# Patient Record
Sex: MALE | Race: OTHER | HISPANIC OR LATINO
[De-identification: names, ages, dates, MRNs, and addresses within clinical notes are randomized per-mention and may not be internally consistent; named-entity substitution may affect disease eponyms.]

---

## 2021-08-13 PROBLEM — Z00.00 ENCOUNTER FOR PREVENTIVE HEALTH EXAMINATION: Status: ACTIVE | Noted: 2021-08-13

## 2021-09-07 ENCOUNTER — APPOINTMENT (OUTPATIENT)
Dept: UROLOGY | Facility: CLINIC | Age: 56
End: 2021-09-07
Payer: COMMERCIAL

## 2021-09-07 VITALS
BODY MASS INDEX: 32.9 KG/M2 | SYSTOLIC BLOOD PRESSURE: 167 MMHG | TEMPERATURE: 97.7 F | DIASTOLIC BLOOD PRESSURE: 90 MMHG | HEIGHT: 71 IN | OXYGEN SATURATION: 99 % | WEIGHT: 235 LBS | HEART RATE: 98 BPM

## 2021-09-07 DIAGNOSIS — C61 MALIGNANT NEOPLASM OF PROSTATE: ICD-10-CM

## 2021-09-07 PROCEDURE — 99204 OFFICE O/P NEW MOD 45 MIN: CPT

## 2021-09-07 NOTE — ASSESSMENT
[FreeTextEntry1] : Today we discussed the natural history of localized prostate cancer, and the heterogeneous biology of this malignancy.  We discussed the fact that many prostate cancers are slow growing and unlikely to metastasize or cause death, whereas others can be life threatening.  We reviewed risk stratification, staging, [place] scoring, and PSA levels as they pertain to risk.  \par \par All treatment options were reviewed.  This included active surveillance, androgen deprivation, emerging options such as focal therapy/HIFU/cryotherapy, radiation options (including IMRT, SBRT, brachytherapy) and surgical options (open vs. robotic surgery, nerve vs. non-nerve sparing).  Oncologic outcomes were compared and contrasted.  Risks of biochemical and clinical recurrence discussed.  Risks of needing adjuvant or salvage treatments reviewed.  We discussed the risks of secondary malignancy after radiation.  We discussed the opportunity for pathological staging with surgery vs. other options.  We discussed the possibility of positive margins, treatment failure, cancer recurrence and cancer-related death even with treatment. \par \par All potential side effects of treatment were reviewed including, but not limited to: short term or permanent stress urinary incontinence and/or short term or permanent erectile dysfunction, penile shortening, rectal symptoms/pain, perineal pain, and other side effects. \par \par All potential complications of treatment and surgery were reviewed including, but not limited to: risks of conversion from MIS to open surgery discussed, bleeding//life-threatening hemorrhage, rectal injury requiring colostomy or delayed recognition leading to fistula, vascular/bowel/adjacent visceral organ injury, trocar/access injury, the possibility of recognized vs. unrecognized/delayed-recognition injury, risks of thermal/blunt/sharp/retraction injury, risks of DVT, PE, MI, death, risks of cardiopulmonary/anesthesia related complications, positional injury, infection/collection/abscess, wound complications/dehiscence/seroma/cellulitis, urinoma/fistula, ureteral injury/obstruction, bladder neck contracture, penile shortening, meatal stenosis, urethral stricture, lymphocele, obturator nerve injury, prolonged anastomotic leak, and other complications. \par \par \par \par \par \par \par \par

## 2021-09-07 NOTE — HISTORY OF PRESENT ILLNESS
[FreeTextEntry1] : Dear Dr Shar Vigil,\par \par Thank you so much for the referral to help care for your patient.\par \par 55 yo male presents with recent diagnosis of prostate cancer. \par WTC survivor\par \par PSA \par 2.33 8/17/21\par \par CT: nodule in left base peripheral zone of prostate gland, nonspecific on CT\par \par \par Scrotal US (ordered for pelvic pain): tubular ectasia of the rete testes bilaterally, no masses, bilateral epididymal cysts, no varicocele or hydrocele\par \par MRI Hx\par MRI at [facility] on 7/27/2021. 58  cc prostate with PIRADS 4 lesion measuring 27w12gp, mid pzpl. The lesion broadly abuts the capsule without visualized gross EPE. No LAD, No Bony Lesions.  The images have been reviewed and clinical implications discussed with the patient.\par \par PMH: CaP, HTN, anxiety\par PSH: prostate biopsy\par MEDS: lorazepam, irbesartan/hctz\par ALLS: NKDA\par FH: neg  malignancies\par SH: no smoking hx, no alcohol, 1 daughter, single, retired\par ROS: abdominal tightness

## 2021-10-26 ENCOUNTER — RESULT REVIEW (OUTPATIENT)
Age: 56
End: 2021-10-26

## 2021-10-26 ENCOUNTER — OUTPATIENT (OUTPATIENT)
Dept: OUTPATIENT SERVICES | Facility: HOSPITAL | Age: 56
LOS: 1 days | End: 2021-10-26
Payer: COMMERCIAL

## 2021-10-26 DIAGNOSIS — C61 MALIGNANT NEOPLASM OF PROSTATE: ICD-10-CM

## 2021-10-26 PROCEDURE — 88321 CONSLTJ&REPRT SLD PREP ELSWR: CPT

## 2021-10-27 ENCOUNTER — NON-APPOINTMENT (OUTPATIENT)
Age: 56
End: 2021-10-27

## 2021-10-27 LAB — SURGICAL PATHOLOGY STUDY: SIGNIFICANT CHANGE UP

## 2021-10-29 VITALS
RESPIRATION RATE: 16 BRPM | DIASTOLIC BLOOD PRESSURE: 79 MMHG | SYSTOLIC BLOOD PRESSURE: 147 MMHG | HEART RATE: 63 BPM | WEIGHT: 236.34 LBS | TEMPERATURE: 98 F | HEIGHT: 71 IN | OXYGEN SATURATION: 98 %

## 2021-10-31 ENCOUNTER — TRANSCRIPTION ENCOUNTER (OUTPATIENT)
Age: 56
End: 2021-10-31

## 2021-11-01 ENCOUNTER — INPATIENT (INPATIENT)
Facility: HOSPITAL | Age: 56
LOS: 0 days | Discharge: HOME | DRG: 708 | End: 2021-11-02
Attending: UROLOGY | Admitting: UROLOGY
Payer: COMMERCIAL

## 2021-11-01 ENCOUNTER — APPOINTMENT (OUTPATIENT)
Dept: UROLOGY | Facility: HOSPITAL | Age: 56
End: 2021-11-01

## 2021-11-01 ENCOUNTER — RESULT REVIEW (OUTPATIENT)
Age: 56
End: 2021-11-01

## 2021-11-01 DIAGNOSIS — Z98.890 OTHER SPECIFIED POSTPROCEDURAL STATES: Chronic | ICD-10-CM

## 2021-11-01 DIAGNOSIS — C61 MALIGNANT NEOPLASM OF PROSTATE: ICD-10-CM

## 2021-11-01 LAB
BLD GP AB SCN SERPL QL: NEGATIVE — SIGNIFICANT CHANGE UP
RH IG SCN BLD-IMP: POSITIVE — SIGNIFICANT CHANGE UP
SARS-COV-2 N GENE NPH QL NAA+PROBE: NOT DETECTED

## 2021-11-01 PROCEDURE — 88331 PATH CONSLTJ SURG 1 BLK 1SPC: CPT | Mod: 26

## 2021-11-01 PROCEDURE — 88305 TISSUE EXAM BY PATHOLOGIST: CPT | Mod: 26

## 2021-11-01 PROCEDURE — S2900 ROBOTIC SURGICAL SYSTEM: CPT

## 2021-11-01 PROCEDURE — 38571 LAPAROSCOPY LYMPHADENECTOMY: CPT

## 2021-11-01 PROCEDURE — 55866 LAPS SURG PRST8ECT RPBIC RAD: CPT

## 2021-11-01 PROCEDURE — 88309 TISSUE EXAM BY PATHOLOGIST: CPT | Mod: 26

## 2021-11-01 RX ORDER — PANTOPRAZOLE SODIUM 20 MG/1
40 TABLET, DELAYED RELEASE ORAL
Refills: 0 | Status: DISCONTINUED | OUTPATIENT
Start: 2021-11-01 | End: 2021-11-02

## 2021-11-01 RX ORDER — GABAPENTIN 400 MG/1
300 CAPSULE ORAL ONCE
Refills: 0 | Status: COMPLETED | OUTPATIENT
Start: 2021-11-01 | End: 2021-11-01

## 2021-11-01 RX ORDER — OXYCODONE HYDROCHLORIDE 5 MG/1
5 TABLET ORAL EVERY 6 HOURS
Refills: 0 | Status: DISCONTINUED | OUTPATIENT
Start: 2021-11-01 | End: 2021-11-02

## 2021-11-01 RX ORDER — LIDOCAINE 4 G/100G
1 CREAM TOPICAL EVERY 24 HOURS
Refills: 0 | Status: DISCONTINUED | OUTPATIENT
Start: 2021-11-01 | End: 2021-11-02

## 2021-11-01 RX ORDER — ACETAMINOPHEN 500 MG
1000 TABLET ORAL ONCE
Refills: 0 | Status: COMPLETED | OUTPATIENT
Start: 2021-11-01 | End: 2021-11-01

## 2021-11-01 RX ORDER — IRBESARTAN AND HYDROCHLOROTHIAZIDE 12.5; 3 MG/1; MG/1
0 TABLET ORAL
Qty: 0 | Refills: 0 | DISCHARGE

## 2021-11-01 RX ORDER — SODIUM CHLORIDE 9 MG/ML
1000 INJECTION, SOLUTION INTRAVENOUS
Refills: 0 | Status: DISCONTINUED | OUTPATIENT
Start: 2021-11-01 | End: 2021-11-02

## 2021-11-01 RX ORDER — ONDANSETRON 8 MG/1
8 TABLET, FILM COATED ORAL EVERY 8 HOURS
Refills: 0 | Status: COMPLETED | OUTPATIENT
Start: 2021-11-01 | End: 2021-11-02

## 2021-11-01 RX ORDER — OXYBUTYNIN CHLORIDE 5 MG
5 TABLET ORAL EVERY 8 HOURS
Refills: 0 | Status: DISCONTINUED | OUTPATIENT
Start: 2021-11-01 | End: 2021-11-02

## 2021-11-01 RX ORDER — ONDANSETRON 8 MG/1
8 TABLET, FILM COATED ORAL EVERY 8 HOURS
Refills: 0 | Status: DISCONTINUED | OUTPATIENT
Start: 2021-11-01 | End: 2021-11-02

## 2021-11-01 RX ORDER — IRBESARTAN 75 MG/1
1 TABLET ORAL
Qty: 0 | Refills: 0 | DISCHARGE

## 2021-11-01 RX ORDER — PANTOPRAZOLE SODIUM 20 MG/1
1 TABLET, DELAYED RELEASE ORAL
Qty: 0 | Refills: 0 | DISCHARGE

## 2021-11-01 RX ORDER — ACETAMINOPHEN 500 MG
1000 TABLET ORAL EVERY 6 HOURS
Refills: 0 | Status: COMPLETED | OUTPATIENT
Start: 2021-11-01 | End: 2021-11-02

## 2021-11-01 RX ORDER — LOSARTAN POTASSIUM 100 MG/1
100 TABLET, FILM COATED ORAL DAILY
Refills: 0 | Status: DISCONTINUED | OUTPATIENT
Start: 2021-11-02 | End: 2021-11-02

## 2021-11-01 RX ORDER — ENOXAPARIN SODIUM 100 MG/ML
40 INJECTION SUBCUTANEOUS ONCE
Refills: 0 | Status: COMPLETED | OUTPATIENT
Start: 2021-11-01 | End: 2021-11-01

## 2021-11-01 RX ORDER — CEFAZOLIN SODIUM 1 G
1000 VIAL (EA) INJECTION EVERY 8 HOURS
Refills: 0 | Status: COMPLETED | OUTPATIENT
Start: 2021-11-01 | End: 2021-11-02

## 2021-11-01 RX ADMIN — Medication 1000 MILLIGRAM(S): at 21:32

## 2021-11-01 RX ADMIN — Medication 100 MILLIGRAM(S): at 23:29

## 2021-11-01 RX ADMIN — Medication 5 MILLIGRAM(S): at 15:06

## 2021-11-01 RX ADMIN — OXYCODONE HYDROCHLORIDE 5 MILLIGRAM(S): 5 TABLET ORAL at 14:18

## 2021-11-01 RX ADMIN — GABAPENTIN 300 MILLIGRAM(S): 400 CAPSULE ORAL at 06:41

## 2021-11-01 RX ADMIN — Medication 1000 MILLIGRAM(S): at 22:02

## 2021-11-01 RX ADMIN — Medication 1000 MILLIGRAM(S): at 06:41

## 2021-11-01 RX ADMIN — LIDOCAINE 1 PATCH: 4 CREAM TOPICAL at 18:18

## 2021-11-01 RX ADMIN — ENOXAPARIN SODIUM 40 MILLIGRAM(S): 100 INJECTION SUBCUTANEOUS at 06:41

## 2021-11-01 RX ADMIN — OXYCODONE HYDROCHLORIDE 5 MILLIGRAM(S): 5 TABLET ORAL at 23:29

## 2021-11-01 RX ADMIN — Medication 1000 MILLIGRAM(S): at 15:06

## 2021-11-01 RX ADMIN — ONDANSETRON 8 MILLIGRAM(S): 8 TABLET, FILM COATED ORAL at 15:43

## 2021-11-01 RX ADMIN — LIDOCAINE 1 PATCH: 4 CREAM TOPICAL at 15:43

## 2021-11-01 RX ADMIN — Medication 1000 MILLIGRAM(S): at 15:52

## 2021-11-01 RX ADMIN — Medication 100 MILLIGRAM(S): at 15:42

## 2021-11-01 RX ADMIN — OXYCODONE HYDROCHLORIDE 5 MILLIGRAM(S): 5 TABLET ORAL at 12:50

## 2021-11-01 RX ADMIN — ONDANSETRON 8 MILLIGRAM(S): 8 TABLET, FILM COATED ORAL at 23:29

## 2021-11-01 RX ADMIN — OXYCODONE HYDROCHLORIDE 5 MILLIGRAM(S): 5 TABLET ORAL at 23:59

## 2021-11-01 NOTE — PROGRESS NOTE ADULT - PROBLEM SELECTOR PLAN 1
- stable post-operatively   - continue givens   - monitor urine color   - pain control: tylenol, lidocaine patch   - bladder spasm regimen: ditropan PRN   - abx: Ancef   - diet: clears   - OOB on POD #0   - SCDs  - ISS

## 2021-11-01 NOTE — PROGRESS NOTE ADULT - ASSESSMENT
57yo male with PMH of prostate cancer, anxiety, hypertension, GERD s/p robotic simple prostatectomy POD#0.

## 2021-11-02 ENCOUNTER — TRANSCRIPTION ENCOUNTER (OUTPATIENT)
Age: 56
End: 2021-11-02

## 2021-11-02 VITALS
DIASTOLIC BLOOD PRESSURE: 71 MMHG | SYSTOLIC BLOOD PRESSURE: 152 MMHG | TEMPERATURE: 98 F | OXYGEN SATURATION: 96 % | RESPIRATION RATE: 18 BRPM | HEART RATE: 75 BPM

## 2021-11-02 LAB
ANION GAP SERPL CALC-SCNC: 11 MMOL/L — SIGNIFICANT CHANGE UP (ref 5–17)
BUN SERPL-MCNC: 17 MG/DL — SIGNIFICANT CHANGE UP (ref 7–23)
CALCIUM SERPL-MCNC: 9.4 MG/DL — SIGNIFICANT CHANGE UP (ref 8.4–10.5)
CHLORIDE SERPL-SCNC: 103 MMOL/L — SIGNIFICANT CHANGE UP (ref 96–108)
CO2 SERPL-SCNC: 25 MMOL/L — SIGNIFICANT CHANGE UP (ref 22–31)
CREAT SERPL-MCNC: 1 MG/DL — SIGNIFICANT CHANGE UP (ref 0.5–1.3)
GLUCOSE SERPL-MCNC: 126 MG/DL — HIGH (ref 70–99)
HCT VFR BLD CALC: 37.5 % — LOW (ref 39–50)
HGB BLD-MCNC: 12.8 G/DL — LOW (ref 13–17)
MCHC RBC-ENTMCNC: 30.4 PG — SIGNIFICANT CHANGE UP (ref 27–34)
MCHC RBC-ENTMCNC: 34.1 GM/DL — SIGNIFICANT CHANGE UP (ref 32–36)
MCV RBC AUTO: 89.1 FL — SIGNIFICANT CHANGE UP (ref 80–100)
NRBC # BLD: 0 /100 WBCS — SIGNIFICANT CHANGE UP (ref 0–0)
PLATELET # BLD AUTO: 217 K/UL — SIGNIFICANT CHANGE UP (ref 150–400)
POTASSIUM SERPL-MCNC: 4.3 MMOL/L — SIGNIFICANT CHANGE UP (ref 3.5–5.3)
POTASSIUM SERPL-SCNC: 4.3 MMOL/L — SIGNIFICANT CHANGE UP (ref 3.5–5.3)
RBC # BLD: 4.21 M/UL — SIGNIFICANT CHANGE UP (ref 4.2–5.8)
RBC # FLD: 13.2 % — SIGNIFICANT CHANGE UP (ref 10.3–14.5)
SODIUM SERPL-SCNC: 139 MMOL/L — SIGNIFICANT CHANGE UP (ref 135–145)
WBC # BLD: 13.52 K/UL — HIGH (ref 3.8–10.5)
WBC # FLD AUTO: 13.52 K/UL — HIGH (ref 3.8–10.5)

## 2021-11-02 RX ORDER — HEPARIN SODIUM 5000 [USP'U]/ML
5000 INJECTION INTRAVENOUS; SUBCUTANEOUS EVERY 8 HOURS
Refills: 0 | Status: DISCONTINUED | OUTPATIENT
Start: 2021-11-02 | End: 2021-11-02

## 2021-11-02 RX ORDER — DOCUSATE SODIUM 100 MG
1 CAPSULE ORAL
Qty: 20 | Refills: 0
Start: 2021-11-02 | End: 2021-11-11

## 2021-11-02 RX ORDER — ACETAMINOPHEN WITH CODEINE 300MG-30MG
1 TABLET ORAL
Qty: 6 | Refills: 0
Start: 2021-11-02 | End: 2021-11-04

## 2021-11-02 RX ADMIN — LIDOCAINE 1 PATCH: 4 CREAM TOPICAL at 14:10

## 2021-11-02 RX ADMIN — PANTOPRAZOLE SODIUM 40 MILLIGRAM(S): 20 TABLET, DELAYED RELEASE ORAL at 06:30

## 2021-11-02 RX ADMIN — LIDOCAINE 1 PATCH: 4 CREAM TOPICAL at 19:27

## 2021-11-02 RX ADMIN — LOSARTAN POTASSIUM 100 MILLIGRAM(S): 100 TABLET, FILM COATED ORAL at 06:00

## 2021-11-02 RX ADMIN — OXYCODONE HYDROCHLORIDE 5 MILLIGRAM(S): 5 TABLET ORAL at 19:35

## 2021-11-02 RX ADMIN — Medication 1000 MILLIGRAM(S): at 09:28

## 2021-11-02 RX ADMIN — Medication 1000 MILLIGRAM(S): at 03:11

## 2021-11-02 RX ADMIN — Medication 100 MILLIGRAM(S): at 06:31

## 2021-11-02 RX ADMIN — LIDOCAINE 1 PATCH: 4 CREAM TOPICAL at 03:14

## 2021-11-02 RX ADMIN — OXYCODONE HYDROCHLORIDE 5 MILLIGRAM(S): 5 TABLET ORAL at 12:02

## 2021-11-02 RX ADMIN — ONDANSETRON 8 MILLIGRAM(S): 8 TABLET, FILM COATED ORAL at 06:31

## 2021-11-02 RX ADMIN — HEPARIN SODIUM 5000 UNIT(S): 5000 INJECTION INTRAVENOUS; SUBCUTANEOUS at 14:09

## 2021-11-02 RX ADMIN — Medication 1000 MILLIGRAM(S): at 03:41

## 2021-11-02 RX ADMIN — OXYCODONE HYDROCHLORIDE 5 MILLIGRAM(S): 5 TABLET ORAL at 13:00

## 2021-11-02 RX ADMIN — Medication 1000 MILLIGRAM(S): at 08:28

## 2021-11-02 RX ADMIN — SODIUM CHLORIDE 120 MILLILITER(S): 9 INJECTION, SOLUTION INTRAVENOUS at 08:29

## 2021-11-02 NOTE — DISCHARGE NOTE PROVIDER - CARE PROVIDER_API CALL
Wilbert Scott)  Urology  [address], [address] Floor  [place], [place] [id number]  Phone: [phone]  Fax: [phone]  Follow Up Time:

## 2021-11-02 NOTE — DISCHARGE NOTE PROVIDER - NSDCCPCAREPLAN_GEN_ALL_CORE_FT
PRINCIPAL DISCHARGE DIAGNOSIS  Diagnosis: Prostate CA  Assessment and Plan of Treatment: Follow up with Dr. Scott      SECONDARY DISCHARGE DIAGNOSES  Diagnosis: Hypertension  Assessment and Plan of Treatment: Continue home medication regimen. Follow up with PCP/Specialist.

## 2021-11-02 NOTE — DISCHARGE NOTE PROVIDER - NSDCMRMEDTOKEN_GEN_ALL_CORE_FT
Augmentin 875 mg-125 mg oral tablet: 1 tab(s) orally every 12 hours   codeine-acetaminophen 30 mg-300 mg oral tablet: 1 tab(s) orally every 6 hours MDD:4  Colace 100 mg oral capsule: 1 cap(s) orally 2 times a day   irbesartan-hydrochlorothiazide 300 mg-25 mg oral tablet:   LORazepam 1 mg oral tablet: 1 tab(s) orally 3 times a day, As Needed  pantoprazole 40 mg oral delayed release tablet: 1 tab(s) orally once a day

## 2021-11-02 NOTE — DISCHARGE NOTE PROVIDER - HOSPITAL COURSE
56 year old male with history of CaP, anxiety, HTN, GERD s/p RALP 11/1. Surgical course without complications. Hospitalization course unremarkable. patient is afebrile, hemodynamically stable and optimized for discharge home.

## 2021-11-02 NOTE — DISCHARGE NOTE PROVIDER - NSDCFUADDINST_GEN_ALL_CORE_FT
-Use extra strength Tylenol (500-650mg) PRN for pain q6hrs  -Use Colace as stool softener as prescribed  -For severe pain not alleviated by OTC tylenol use acetaminophen-codeine #3 every 6 hours only as needed  -Start antibiotics (Augmentin 875-125mg BID) 1 day prior to catheter removal in office      Cisneros Catheter Instructions:  - Please care for and empty urinary catheter bag as instructed by nurse.    General Discharge Instructions:  Use Tylenol for pain control as needed  Please resume all regular home medications unless specifically advised not to take a particular medication. Also, please take any new medications as prescribed.  Please get plenty of rest, continue to ambulate several times per day, and drink adequate amounts of fluids.     Warning Signs:  Please call your doctor if you experience the following:  *You experience new chest pain, pressure, squeezing or tightness.  *New or worsening cough, shortness of breath, or wheeze.  *If you are vomiting and cannot keep down fluids or your medications.  *You are getting dehydrated due to continued vomiting, diarrhea, or other reasons. Signs of dehydration include dry mouth, rapid heartbeat, or feeling dizzy or faint when standing.  *You see blood or dark/black material when you vomit or have a bowel movement.  *You experience burning when you urinate, have blood in your urine, or experience a discharge.  *Your pain is not improving within 8-12 hours or is not gone within 24 hours. Call or return immediately if your pain is getting worse, changes location, or moves to your chest or back.  *You have shaking chills, or fever greater than 100.4 degrees Fahrenheit.  *Any change in your symptoms, or any new symptoms that concern you.

## 2021-11-02 NOTE — DISCHARGE NOTE NURSING/CASE MANAGEMENT/SOCIAL WORK - PATIENT PORTAL LINK FT
You can access the FollowMyHealth Patient Portal offered by [facility] by registering at the following website: [email or web address]. By joining [email or web address]’s FollowMyHealth portal, you will also be able to view your health information using other applications (apps) compatible with our system.

## 2021-11-02 NOTE — PROGRESS NOTE ADULT - SUBJECTIVE AND OBJECTIVE BOX
INTERVAL HPI/OVERNIGHT EVENTS:  No acute events overnight.    VITALS:    T(F): 98.4 (11-02-21 @ 04:50), Max: 98.6 (11-02-21 @ 00:03)  HR: 72 (11-02-21 @ 04:50) (72 - 99)  BP: 133/77 (11-02-21 @ 04:50) (125/59 - 158/90)  RR: 18 (11-02-21 @ 04:50) (17 - 24)  SpO2: 97% (11-02-21 @ 04:50) (95% - 100%)  Wt(kg): --    I&O's Detail    01 Nov 2021 07:01  -  02 Nov 2021 05:34  --------------------------------------------------------  IN:    IV PiggyBack: 50 mL    Lactated Ringers: 1800 mL  Total IN: 1850 mL    OUT:    Indwelling Catheter - Urethral (mL): 3545 mL  Total OUT: 3545 mL    Total NET: -1695 mL          MEDICATIONS:    ANTIBIOTICS:  ceFAZolin   IVPB 1000 milliGRAM(s) IV Intermittent every 8 hours      PAIN CONTROL:  acetaminophen     Tablet .. 1000 milliGRAM(s) Oral every 6 hours  LORazepam     Tablet 1 milliGRAM(s) Oral every 12 hours PRN  ondansetron    Tablet 8 milliGRAM(s) Oral every 8 hours  ondansetron    Tablet 8 milliGRAM(s) Oral every 8 hours PRN  oxyCODONE    IR 5 milliGRAM(s) Oral every 6 hours PRN       MEDS:  oxybutynin 5 milliGRAM(s) Oral every 8 hours PRN      HEME/ONC        PHYSICAL EXAM:  General: No acute distress.  Alert and Oriented  Abdominal Exam: soft appropriately tender/ softly distended. Incision sites C/D/I.    Exam: Cisneros catheter in place draining well.      LABS:                RADIOLOGY & ADDITIONAL TESTS:    ASSESSMENT: 56yMale w/ CaP s/p RALP.     PLAN:  Diet: clears  Pain control  Monitor Urine Output  DVT ppx: SCD  Cont Abx: Ancef  OOB/IS  
   POST OP NOTE:  procedure: robotic radical prostatectomy     Patient admits to generalized abdominal pain. Denies chest pain, SOB, nausea, vomiting, fevers or chills.       T(C): 36.1 (11-01-21 @ 12:37), Max: 36.2 (11-01-21 @ 11:37)  HR: 86 (11-01-21 @ 12:37) (78 - 86)  BP: 151/66 (11-01-21 @ 12:37) (125/59 - 151/66)  RR: 17 (11-01-21 @ 12:37) (17 - 24)  SpO2: 98% (11-01-21 @ 12:37) (98% - 100%)    GEN: no acute distress, alert and oriented   ABD: Soft, non-tender, non-distended. incisions c/d/i   : givens in place draining light pink urine. no clots noted

## 2021-11-03 ENCOUNTER — NON-APPOINTMENT (OUTPATIENT)
Age: 56
End: 2021-11-03

## 2021-11-03 PROBLEM — I10 ESSENTIAL (PRIMARY) HYPERTENSION: Chronic | Status: ACTIVE | Noted: 2021-10-29

## 2021-11-03 PROBLEM — C61 MALIGNANT NEOPLASM OF PROSTATE: Chronic | Status: ACTIVE | Noted: 2021-10-29

## 2021-11-03 PROBLEM — K21.9 GASTRO-ESOPHAGEAL REFLUX DISEASE WITHOUT ESOPHAGITIS: Chronic | Status: ACTIVE | Noted: 2021-10-29

## 2021-11-04 ENCOUNTER — APPOINTMENT (OUTPATIENT)
Dept: UROLOGY | Facility: CLINIC | Age: 56
End: 2021-11-04
Payer: COMMERCIAL

## 2021-11-04 VITALS
OXYGEN SATURATION: 98 % | SYSTOLIC BLOOD PRESSURE: 142 MMHG | TEMPERATURE: 98.6 F | HEART RATE: 74 BPM | RESPIRATION RATE: 20 BRPM | DIASTOLIC BLOOD PRESSURE: 80 MMHG

## 2021-11-04 PROCEDURE — 99024 POSTOP FOLLOW-UP VISIT: CPT

## 2021-11-04 RX ORDER — AMOXICILLIN AND CLAVULANATE POTASSIUM 875; 125 MG/1; MG/1
875-125 TABLET, COATED ORAL TWICE DAILY
Qty: 20 | Refills: 0 | Status: ACTIVE | COMMUNITY
Start: 2021-11-04 | End: 1900-01-01

## 2021-11-04 NOTE — PHYSICAL EXAM
[General Appearance - Well Developed] : well developed [General Appearance - Well Nourished] : well nourished [Normal Appearance] : normal appearance [Well Groomed] : well groomed [General Appearance - In No Acute Distress] : no acute distress [FreeTextEntry1] : mild pain with palpation of L surgical site, abdomen soft  [Edema] : no peripheral edema [] : no respiratory distress [Respiration, Rhythm And Depth] : normal respiratory rhythm and effort [Exaggerated Use Of Accessory Muscles For Inspiration] : no accessory muscle use [Oriented To Time, Place, And Person] : oriented to person, place, and time [Affect] : the affect was normal [Mood] : the mood was normal [Not Anxious] : not anxious [Normal Station and Gait] : the gait and station were normal for the patient's age [No Focal Deficits] : no focal deficits

## 2021-11-04 NOTE — HISTORY OF PRESENT ILLNESS
[FreeTextEntry1] : 57 yo male s/p RALP 11/1/21 presents with concern for catheter dislodgement. He has experienced "chills and shakes" that wake him up, one episode each night for the past two nights. He has never felt febrile but did not take temperature. L abdominal site slightly sore. He has not had a BM but is passing flatus. Otherwise feels well postop.

## 2021-11-04 NOTE — ASSESSMENT
[FreeTextEntry1] : 57 yo male s/p RALP 11/1/21 complaining of "chills and shakes" x2 episodes and concern for catheter dislodgement. Stat lock had come off and is tugging/uncomfortable.\par \par 1. Catheter draining well, statlock replaced\par 2. Miralax and docusate\par 3. CBC, BMP, culture (from bag)\par 4. Preop culture negative, will give augmentin x10 days prophylactically\par 5. Advised on return precautions\par \par Follow up 11/9/21 for postop visit/catheter removal\par

## 2021-11-08 LAB — SURGICAL PATHOLOGY STUDY: SIGNIFICANT CHANGE UP

## 2021-11-09 ENCOUNTER — APPOINTMENT (OUTPATIENT)
Dept: UROLOGY | Facility: CLINIC | Age: 56
End: 2021-11-09
Payer: COMMERCIAL

## 2021-11-09 VITALS
SYSTOLIC BLOOD PRESSURE: 140 MMHG | HEART RATE: 77 BPM | OXYGEN SATURATION: 98 % | TEMPERATURE: 98.1 F | DIASTOLIC BLOOD PRESSURE: 70 MMHG

## 2021-11-09 DIAGNOSIS — F41.9 ANXIETY DISORDER, UNSPECIFIED: ICD-10-CM

## 2021-11-09 DIAGNOSIS — K21.9 GASTRO-ESOPHAGEAL REFLUX DISEASE WITHOUT ESOPHAGITIS: ICD-10-CM

## 2021-11-09 DIAGNOSIS — I10 ESSENTIAL (PRIMARY) HYPERTENSION: ICD-10-CM

## 2021-11-09 DIAGNOSIS — C61 MALIGNANT NEOPLASM OF PROSTATE: ICD-10-CM

## 2021-11-09 PROCEDURE — 99024 POSTOP FOLLOW-UP VISIT: CPT

## 2021-11-09 NOTE — HISTORY OF PRESENT ILLNESS
[FreeTextEntry1] : Patient returns 1 week after RALP for catheter removal.\par \par Post-op came reporting 1 episode shaking chills x 2 nights. Was transient, patient did not feel like had fever, and immediately went back to sleep. Came to office, labs were within normal limits, and started empirically on Augmentin. \par \par Since that time reports resolution of symptoms. He has been doing well without N/V/Fever. Urine clear, draining well. Tolerating a regular diet, passing gas and BM. No incisional complaints. No leg swelling or calf pain, no SOB or CP.\par \par Bladder filled with ~200cc of urine Cisneros removed without issue. Patient voided without issue.\par \par Plan is for PSA in 6 weeks\par \par Patient educated as to performance/importance of Kegel exercises; will also arrange PT. Daily 5mg Cialis. \par \par Pathology reviewed and options discussed (see below)\par \par GG2\par EPE\par 0/5 LN\par [id number]\par

## 2021-11-09 NOTE — PHYSICAL EXAM
[General Appearance - Well Developed] : well developed [General Appearance - Well Nourished] : well nourished [Normal Appearance] : normal appearance [Well Groomed] : well groomed [General Appearance - In No Acute Distress] : no acute distress [Edema] : no peripheral edema [Respiration, Rhythm And Depth] : normal respiratory rhythm and effort [Exaggerated Use Of Accessory Muscles For Inspiration] : no accessory muscle use [Abdomen Soft] : soft [Abdomen Tenderness] : non-tender [Costovertebral Angle Tenderness] : no ~M costovertebral angle tenderness [Urethral Meatus] : meatus normal [Normal Station and Gait] : the gait and station were normal for the patient's age [] : no rash [No Focal Deficits] : no focal deficits [Oriented To Time, Place, And Person] : oriented to person, place, and time [Affect] : the affect was normal [Mood] : the mood was normal [Not Anxious] : not anxious [No Palpable Adenopathy] : no palpable adenopathy [FreeTextEntry1] : Catheter removed without issue

## 2021-11-09 NOTE — ASSESSMENT
[FreeTextEntry1] : 56 s/p RALP. Doing well.\par \par - fu in 6 weeks, PSA beforehand\par - daily Cialis\par - importance of Kegels discussed

## 2021-11-11 ENCOUNTER — APPOINTMENT (OUTPATIENT)
Dept: UROLOGY | Facility: CLINIC | Age: 56
End: 2021-11-11

## 2021-11-11 LAB
ANION GAP SERPL CALC-SCNC: 15 MMOL/L
BACTERIA UR CULT: NORMAL
BASOPHILS # BLD AUTO: 0.02 K/UL
BASOPHILS NFR BLD AUTO: 0.2 %
BUN SERPL-MCNC: 15 MG/DL
CALCIUM SERPL-MCNC: 9.8 MG/DL
CHLORIDE SERPL-SCNC: 98 MMOL/L
CO2 SERPL-SCNC: 26 MMOL/L
CREAT SERPL-MCNC: 0.93 MG/DL
EOSINOPHIL # BLD AUTO: 0.03 K/UL
EOSINOPHIL NFR BLD AUTO: 0.4 %
GLUCOSE SERPL-MCNC: 113 MG/DL
HCT VFR BLD CALC: 39.7 %
HGB BLD-MCNC: 13.2 G/DL
IMM GRANULOCYTES NFR BLD AUTO: 0.4 %
LYMPHOCYTES # BLD AUTO: 1.75 K/UL
LYMPHOCYTES NFR BLD AUTO: 21.1 %
MAN DIFF?: NORMAL
MCHC RBC-ENTMCNC: 30.5 PG
MCHC RBC-ENTMCNC: 33.2 GM/DL
MCV RBC AUTO: 91.7 FL
MONOCYTES # BLD AUTO: 0.96 K/UL
MONOCYTES NFR BLD AUTO: 11.6 %
NEUTROPHILS # BLD AUTO: 5.49 K/UL
NEUTROPHILS NFR BLD AUTO: 66.3 %
PLATELET # BLD AUTO: 228 K/UL
POTASSIUM SERPL-SCNC: 4.5 MMOL/L
RBC # BLD: 4.33 M/UL
RBC # FLD: 13.6 %
SODIUM SERPL-SCNC: 139 MMOL/L
WBC # FLD AUTO: 8.28 K/UL

## 2021-11-17 PROCEDURE — 80048 BASIC METABOLIC PNL TOTAL CA: CPT

## 2021-11-17 PROCEDURE — 86900 BLOOD TYPING SEROLOGIC ABO: CPT

## 2021-11-17 PROCEDURE — 88305 TISSUE EXAM BY PATHOLOGIST: CPT

## 2021-11-17 PROCEDURE — 86901 BLOOD TYPING SEROLOGIC RH(D): CPT

## 2021-11-17 PROCEDURE — 86850 RBC ANTIBODY SCREEN: CPT

## 2021-11-17 PROCEDURE — C1889: CPT

## 2021-11-17 PROCEDURE — C9399: CPT

## 2021-11-17 PROCEDURE — S2900: CPT

## 2021-11-17 PROCEDURE — 36415 COLL VENOUS BLD VENIPUNCTURE: CPT

## 2021-11-17 PROCEDURE — 85027 COMPLETE CBC AUTOMATED: CPT

## 2021-11-17 PROCEDURE — 88331 PATH CONSLTJ SURG 1 BLK 1SPC: CPT

## 2021-11-17 PROCEDURE — 88309 TISSUE EXAM BY PATHOLOGIST: CPT

## 2021-11-23 RX ORDER — TADALAFIL 5 MG/1
5 TABLET ORAL
Qty: 30 | Refills: 3 | Status: ACTIVE | COMMUNITY
Start: 2021-11-09 | End: 1900-01-01

## 2022-01-04 ENCOUNTER — APPOINTMENT (OUTPATIENT)
Dept: UROLOGY | Facility: CLINIC | Age: 57
End: 2022-01-04

## 2022-01-11 NOTE — HISTORY OF PRESENT ILLNESS
[FreeTextEntry1] : CC: s/p RALP\par \par HPI: 56 year old s/p RALP 11/1/21\par \par Pathology: GG2, EPE, 0/5 LN, [id number]\par \par \par PMH: CaP, HTN, anxiety\par PSH: prostate biopsy\par MEDS: lorazepam, irbesartan/hctz\par ALLS: NKDA\par FH: neg  malignancies\par SH: no smoking hx, no alcohol, 1 daughter, single, retired\par ROS: abdominal tightness

## 2022-03-29 ENCOUNTER — APPOINTMENT (OUTPATIENT)
Dept: UROLOGY | Facility: CLINIC | Age: 57
End: 2022-03-29
Payer: COMMERCIAL

## 2022-03-29 ENCOUNTER — TRANSCRIPTION ENCOUNTER (OUTPATIENT)
Age: 57
End: 2022-03-29

## 2022-03-29 VITALS
DIASTOLIC BLOOD PRESSURE: 67 MMHG | SYSTOLIC BLOOD PRESSURE: 146 MMHG | OXYGEN SATURATION: 98 % | TEMPERATURE: 97.3 F | HEART RATE: 64 BPM

## 2022-03-29 PROCEDURE — 99214 OFFICE O/P EST MOD 30 MIN: CPT

## 2022-03-29 NOTE — PHYSICAL EXAM
[General Appearance - Well Developed] : well developed [General Appearance - Well Nourished] : well nourished [Normal Appearance] : normal appearance [Well Groomed] : well groomed [General Appearance - In No Acute Distress] : no acute distress [Edema] : no peripheral edema [] : no respiratory distress [Respiration, Rhythm And Depth] : normal respiratory rhythm and effort [Exaggerated Use Of Accessory Muscles For Inspiration] : no accessory muscle use [Abdomen Soft] : soft [Abdomen Tenderness] : non-tender [Costovertebral Angle Tenderness] : no ~M costovertebral angle tenderness [Urethral Meatus] : meatus normal [Penis Abnormality] : normal uncircumcised penis [Scrotum] : the scrotum was normal [Urinary Bladder Findings] : the bladder was normal on palpation [Epididymis] : the epididymides were normal [Testes Tenderness] : no tenderness of the testes [Testes Mass (___cm)] : there were no testicular masses [No Focal Deficits] : no focal deficits [Oriented To Time, Place, And Person] : oriented to person, place, and time [Affect] : the affect was normal [Mood] : the mood was normal [Not Anxious] : not anxious [No Palpable Adenopathy] : no palpable adenopathy

## 2022-03-29 NOTE — ASSESSMENT
[FreeTextEntry1] : Diagnosis:\par History of prostate cancer\par \par \par Plan: \par PSA in 6 months \par He is open to injection therapy in the future but not at this moment\par Observe and reassess in 6 mo\par \par \par Wilbert Scott MD, FR \par  of Urology [facility]\par Director of Laparoscopic and Robotic Surgery \par [facility] Director of Urology, [facility] \par Professor of Urology\par \par (Office) [phone]\par (Cell)  [phone] \par [email or web address]\par \par \par

## 2022-03-29 NOTE — HISTORY OF PRESENT ILLNESS
[FreeTextEntry1] : Shar Vigil MD\par [address] \[place] [address]\par  \par CC: s/p RALP\par \par HPI: 56 year old s/p RALP 11/1/21\par Pathology: GG2, EPE, 0/5 LN, [id number]\par PSA < 0.02 ng/ml\par \par He has some leak with "lifting, but overall pretty good"; "gets a leak here and there if lifting". \par He states that "absolutely is improvlng".  No leak on exam even when provoke with 5 coughs. \par \par He recently started tadalafil 5mg but no erections yet  \par \par PMH: CaP, HTN, anxiety\par PSH: prostate biopsy, RALP \par MEDS: lorazepam, irbesartan/hctz\par ALLS: NKDA\par FH: neg  malignancies\par SH: no smoking hx, no alcohol, 1 daughter, single, retired\par ROS: abdominal tightness \par  \par

## 2022-07-23 NOTE — PRE-OP CHECKLIST - LATEX ALLERGY
Reviewed self and infant care w / mom, she verbalizes understanding of instructions reviewed. Encourage to follow up w/ MDs as directed and w/ questions/concerns.
no

## 2023-01-05 ENCOUNTER — APPOINTMENT (OUTPATIENT)
Dept: UROLOGY | Facility: CLINIC | Age: 58
End: 2023-01-05
Payer: COMMERCIAL

## 2023-01-05 VITALS
DIASTOLIC BLOOD PRESSURE: 81 MMHG | HEART RATE: 71 BPM | SYSTOLIC BLOOD PRESSURE: 162 MMHG | OXYGEN SATURATION: 98 % | TEMPERATURE: 97.3 F

## 2023-01-05 PROCEDURE — 99214 OFFICE O/P EST MOD 30 MIN: CPT

## 2023-01-05 RX ORDER — TADALAFIL 20 MG/1
20 TABLET ORAL
Qty: 30 | Refills: 3 | Status: ACTIVE | COMMUNITY
Start: 2023-01-05 | End: 1900-01-01

## 2023-01-05 NOTE — PHYSICAL EXAM
[General Appearance - Well Developed] : well developed [General Appearance - Well Nourished] : well nourished [Normal Appearance] : normal appearance [Well Groomed] : well groomed [General Appearance - In No Acute Distress] : no acute distress [Edema] : no peripheral edema [Respiration, Rhythm And Depth] : normal respiratory rhythm and effort [Exaggerated Use Of Accessory Muscles For Inspiration] : no accessory muscle use [Abdomen Soft] : soft [Abdomen Tenderness] : non-tender [Costovertebral Angle Tenderness] : no ~M costovertebral angle tenderness [] : no rash [No Focal Deficits] : no focal deficits [Oriented To Time, Place, And Person] : oriented to person, place, and time [Affect] : the affect was normal [Mood] : the mood was normal [Not Anxious] : not anxious

## 2023-01-05 NOTE — ASSESSMENT
[FreeTextEntry1] : Diagnosis: Prostate Cancer, ED post radical prostatectomy\par \par Plan:\par PSA today\par Tadalafil 20 mg 3 x week\par Referral to Dr. Mckeon for discussion of ICI\par If PSA stable repeat in 6 months \par \par

## 2023-01-09 ENCOUNTER — NON-APPOINTMENT (OUTPATIENT)
Age: 58
End: 2023-01-09

## 2023-01-09 LAB — PSA, POST - PROSTATECTOMY: 0.03 NG/ML

## 2023-01-10 ENCOUNTER — APPOINTMENT (OUTPATIENT)
Dept: UROLOGY | Facility: CLINIC | Age: 58
End: 2023-01-10
Payer: COMMERCIAL

## 2023-01-10 VITALS
WEIGHT: 215 LBS | OXYGEN SATURATION: 97 % | HEART RATE: 66 BPM | TEMPERATURE: 97.3 F | DIASTOLIC BLOOD PRESSURE: 82 MMHG | HEIGHT: 71 IN | SYSTOLIC BLOOD PRESSURE: 164 MMHG | BODY MASS INDEX: 30.1 KG/M2

## 2023-01-10 PROCEDURE — 99214 OFFICE O/P EST MOD 30 MIN: CPT

## 2023-01-10 RX ORDER — PAPAVERINE HYDROCHLORIDE 30 MG/ML
30 INJECTION, SOLUTION INTRAVENOUS
Qty: 2 | Refills: 0 | Status: ACTIVE | COMMUNITY
Start: 2023-01-10 | End: 1900-01-01

## 2023-01-10 NOTE — PHYSICAL EXAM
[General Appearance - Well Developed] : well developed [General Appearance - Well Nourished] : well nourished [Normal Appearance] : normal appearance [Well Groomed] : well groomed [General Appearance - In No Acute Distress] : no acute distress [Urethral Meatus] : meatus normal [Penis Abnormality] : normal uncircumcised penis [Urinary Bladder Findings] : the bladder was normal on palpation [Scrotum] : the scrotum was normal [Testes Mass (___cm)] : there were no testicular masses [Affect] : the affect was normal [Oriented To Time, Place, And Person] : oriented to person, place, and time [Mood] : the mood was normal [Not Anxious] : not anxious [FreeTextEntry1] : 8.5 cm penile length

## 2023-01-10 NOTE — HISTORY OF PRESENT ILLNESS
[FreeTextEntry1] : 57 year old retired \par single\par referred by Dr. Scott\par developed ED after RALP in November 2021\par prior to surgery, sexually active, 1 partner, straight\par started tadalafil 20 mg 1 weeks ago with mild response, but not optimal\par able to achieve orgasm, but no ejaculation\par \par

## 2023-01-10 NOTE — ASSESSMENT
[FreeTextEntry1] : 57 year old retired \par single; sexually active\par referred by Dr. Scott\par developed ED after RALP in November 2021\par prior to surgery, sexually active, 1 partner, straight\par started tadalafil 20 mg 1 weeks ago with mild response, but not optimal\par able to achieve orgasm, but no ejaculation\par \par discussed different management options for ED 1. vacuum device 2. intracavernosal injections 3. penile prosthesis\par discussed benefits and risks of each option\par Informational materials given to patient.\par discussed significance of penile DUS \par given written materials for ED options, proper technique for ICI\par discussed starting bimix in light of excellent preoperative erection and the absence of comorbidities\par \par plan:\par 1. penile DUS\par 2. bimix

## 2023-01-12 ENCOUNTER — APPOINTMENT (OUTPATIENT)
Dept: UROLOGY | Facility: CLINIC | Age: 58
End: 2023-01-12

## 2023-05-18 ENCOUNTER — NON-APPOINTMENT (OUTPATIENT)
Age: 58
End: 2023-05-18

## 2024-03-27 ENCOUNTER — APPOINTMENT (OUTPATIENT)
Dept: UROLOGY | Facility: CLINIC | Age: 59
End: 2024-03-27
Payer: COMMERCIAL

## 2024-03-27 PROCEDURE — 99213 OFFICE O/P EST LOW 20 MIN: CPT

## 2024-03-27 RX ORDER — PAPAVERINE HYDROCHLORIDE 30 MG/ML
30 INJECTION, SOLUTION INTRAVENOUS
Qty: 2 | Refills: 0 | Status: ACTIVE | COMMUNITY
Start: 2024-03-27 | End: 1900-01-01

## 2024-03-27 NOTE — ASSESSMENT
[FreeTextEntry1] : 57 year old retired  single; sexually active referred by Dr. Scott developed ED after RALP in November 2021 prior to surgery, sexually active, 1 partner, straight started tadalafil 20 mg 1 weeks ago with mild response, but not optimal able to achieve orgasm, but no ejaculation  discussed different management options for ED 1. vacuum device 2. intracavernosal injections 3. penile prosthesis discussed benefits and risks of each option Informational materials given to patient. discussed significance of penile DUS  given written materials for ED options, proper technique for ICI discussed starting bimix in light of excellent preoperative erection and the absence of comorbidities  plan: 1. penile DUS 2. bimix  3.27.2024 returns re ED  s/p RALP doing well  pleased with urinary symptoms  DIscussed  treatment options including PD 5-I, Intracavernosal therapy, erection vacuum device. Instructional materials provided to patient. Risks and side effects of each discussed. Warned re prolonged erection or prolonged application of constriction band. patient feels he did not understand impact of surgery on ED patient wishes to proceed with IC risks fibrosis; Warned re priapism risk and need for immediate intervention if erection lasts more than 2 hours.  Patient instructed to report to an emergency room and explicitly inform staff of his condition and need for treatment. Informational materials given to patient.  Discussed BIMIX vs TRIMX DUS

## 2024-03-27 NOTE — HISTORY OF PRESENT ILLNESS
[FreeTextEntry1] : 57 year old retired  single referred by Dr. Scott developed ED after RALP in November 2021 prior to surgery, sexually active, 1 partner, straight started tadalafil 20 mg 1 weeks ago with mild response, but not optimal able to achieve orgasm, but no ejaculation   3.27.2024 s.p RALP PSA 1.2024 reported as normal excellent urinary control complaining of erectile dysfunction partial AM erection  2/10

## 2024-03-27 NOTE — PHYSICAL EXAM
[General Appearance - Well Developed] : well developed [General Appearance - Well Nourished] : well nourished [Normal Appearance] : normal appearance [Well Groomed] : well groomed [General Appearance - In No Acute Distress] : no acute distress [Urethral Meatus] : meatus normal [Penis Abnormality] : normal uncircumcised penis [Urinary Bladder Findings] : the bladder was normal on palpation [Scrotum] : the scrotum was normal [Testes Mass (___cm)] : there were no testicular masses [FreeTextEntry1] : 8.5 cm penile length [Oriented To Time, Place, And Person] : oriented to person, place, and time [Affect] : the affect was normal [Mood] : the mood was normal [Not Anxious] : not anxious

## 2024-04-16 ENCOUNTER — APPOINTMENT (OUTPATIENT)
Dept: UROLOGY | Facility: CLINIC | Age: 59
End: 2024-04-16
Payer: COMMERCIAL

## 2024-04-16 DIAGNOSIS — N52.31 ERECTILE DYSFUNCTION FOLLOWING RADICAL PROSTATECTOMY: ICD-10-CM

## 2024-04-16 PROCEDURE — 93980 PENILE VASCULAR STUDY: CPT

## 2024-04-16 PROCEDURE — 99213 OFFICE O/P EST LOW 20 MIN: CPT | Mod: 25

## 2024-04-16 PROCEDURE — 54235 NJX CORPORA CAVERNOSA RX AGT: CPT

## 2024-04-16 PROCEDURE — 54220 IRRG CRPRA CAVRNOSA PRIAPISM: CPT

## 2024-04-16 NOTE — ASSESSMENT
[FreeTextEntry1] : 57 year old retired  single; sexually active referred by Dr. Scott developed ED after RALP in November 2021 prior to surgery, sexually active, 1 partner, straight started tadalafil 20 mg 1 weeks ago with mild response, but not optimal able to achieve orgasm, but no ejaculation  discussed different management options for ED 1. vacuum device 2. intracavernosal injections 3. penile prosthesis discussed benefits and risks of each option Informational materials given to patient. discussed significance of penile DUS  given written materials for ED options, proper technique for ICI discussed starting bimix in light of excellent preoperative erection and the absence of comorbidities  plan: 1. penile DUS 2. bimix   4.16.2024 returns for DUS normal vascular response to BIMIX 30/2 at 15 units instructed re IC Informational materials given to patient. will start home injection at 10 units BIMIX 30/2 Warned re priapism risk and need for immediate intervention if erection lasts more than 2 hours.  Patient instructed to report to an emergency room and explicitly inform staff of his condition and need for treatment.

## 2024-04-16 NOTE — HISTORY OF PRESENT ILLNESS
[FreeTextEntry1] : 57 year old retired  single referred by Dr. Scott developed ED after RALP in November 2021 prior to surgery, sexually active, 1 partner, straight started tadalafil 20 mg 1 weeks ago with mild response, but not optimal able to achieve orgasm, but no ejaculation   3.27.2024 s.p RALP PSA 1.2024 reported as normal excellent urinary control complaining of erectile dysfunction partial AM erection  2/10  4.16.2024 returrns for DUS and management

## 2024-04-17 ENCOUNTER — NON-APPOINTMENT (OUTPATIENT)
Age: 59
End: 2024-04-17

## 2024-04-25 ENCOUNTER — APPOINTMENT (OUTPATIENT)
Dept: UROLOGY | Facility: CLINIC | Age: 59
End: 2024-04-25

## 2024-04-27 ENCOUNTER — TRANSCRIPTION ENCOUNTER (OUTPATIENT)
Age: 59
End: 2024-04-27

## 2025-03-04 NOTE — PATIENT PROFILE ADULT - NSPROGENOTHERPROVIDER_GEN_A_NUR
systems review negative  Physical Examination :       Physical Exam  Constitutional:       General: He is not in acute distress.  HENT:      Head: Normocephalic and atraumatic.      Right Ear: External ear normal.      Left Ear: External ear normal.   Eyes:      General: No scleral icterus.     Conjunctiva/sclera: Conjunctivae normal.   Cardiovascular:      Rate and Rhythm: Normal rate and regular rhythm.   Pulmonary:      Effort: Pulmonary effort is normal. No respiratory distress.      Comments: Decreased breath sounds bilaterally  Abdominal:      General: There is no distension.      Palpations: Abdomen is soft.   Musculoskeletal:      Cervical back: Neck supple. No rigidity.      Right lower leg: Edema present.      Left lower leg: Edema present.   Skin:     General: Skin is warm.      Coloration: Skin is not jaundiced.   Neurological:      General: No focal deficit present.      Mental Status: He is alert and oriented to person, place, and time.   Psychiatric:         Mood and Affect: Mood normal.         Behavior: Behavior normal.         Past Medical History:     Past Medical History:   Diagnosis Date    Abnormal EKG     Acute respiratory failure (HCC) 03/17/2020    Anesthesia complication 2005    woke up during neck surgery    Anxiety     Dr. Marlow    Cancer (Formerly [facility])     Prostate    Colon polyps     Hx of blood clots 06/28/2021    [facility]    Hyperlipidemia     Dr. Hoffman    Prostate CA (HCC)     Pulmonary emboli ([facility]) 06/28/2021    Per CTA chest, 6-28-21-\" Large saddle pulmonary embolism with extensive thrombus load and severe right heart strain.\"    Pulmonary emboli ([facility]) 12/03/2024    SOB (shortness of breath) on exertion     STEMI (ST elevation myocardial infarction) (Formerly [facility])     Wears partial dentures     upper & lower    Wellness examination     Dr. Hoffman- PCP       Past Surgical  History:     Past Surgical History:   Procedure Laterality Date    ARM SURGERY Right 10/16/2023    ARM LESION  12/27/2024 ST SPECIAL PROCEDURES    JOINT REPLACEMENT Right 05/25/2022    knee Dr Edmonds    JOINT REPLACEMENT Left 02/22/2023    knee Dr Edmonds    KNEE SURGERY Right     LIPOMA RESECTION      Multiple    OTHER SURGICAL HISTORY  06/2021    pulmonary emboli [facility]    PROSTATE BIOPSY      PROSTATECTOMY  03/13/2020    LAPAROSCOPIC ROBOTIC PROSTATECTOMY, PELVIC LYMPHNODE DISSECTION     PROSTATECTOMY N/A 03/13/2020    XI LAPAROSCOPIC ROBOTIC PROSTATECTOMY, PELVIC LYMPHNODE DISSECTION performed by Severino Fonseca MD at [facility] OR    TONSILLECTOMY      AS 18 YR. OLD    TRACHEOSTOMY N/A 12/20/2024    TRACHEOTOMY performed by Td Hooper MD at [facility] OR    UPPER GASTROINTESTINAL ENDOSCOPY N/A 12/20/2024    ESOPHAGOGASTRODUODENOSCOPY PERCUTANEOUS ENDOSCOPIC GASTROSTOMY TUBE INSERTION- GI SCHEDULED performed by Td Hooper MD at [facility] OR    VASCULAR SURGERY N/A 12/4/2024    E2 PERCUTANEOUS BILATERAL PULMONARY THROMBECTOMY MECHANICAL / INARI performed by Delos Reyes, Arthur, MD at [facility] CVOR    WRIST SURGERY Left           SOCIAL HISTORY:  Social History     Socioeconomic History    Marital status:      Spouse name: Not on file    Number of children: Not on file    Years of education: Not on file    Highest education level: Not on file   Occupational History    Not on file   Tobacco Use    Smoking status: Former     Current packs/day: 0.00     Average packs/day: 0.5 packs/day for 58.4 years (29.2 ttl pk-yrs)     Types: Cigarettes     Start date: 1/1/1963     Quit date: 5/11/2021     Years since quitting: 3.8    Smokeless tobacco: Never   Vaping Use    Vaping status: Never Used   Substance and Sexual Activity    Alcohol use: Not Currently    Drug use: Never    Sexual activity: Yes     Partners: Female   Other Topics Concern    Not on file   Social History Narrative    Not on file     Social Determinants of Health     Financial Resource Strain: Low Risk  (12/27/2024)    [facility]     There's been No change in energy level, No change in activity level.     Eyes: No visual changes or diplopia. No scleral icterus.  ENT: No Headaches, hearing loss or vertigo. No mouth sores or sore throat.  Cardiovascular: As HPI  Respiratory: As HPI  Gastrointestinal: No abdominal pain, appetite loss, blood in stools. No change in bowel or bladder habits.  Genitourinary: No dysuria, trouble voiding, or hematuria.  Musculoskeletal:  No gait disturbance, No weakness or joint complaints.  Integumentary: No rash or pruritis.  Neurological: No headache, diplopia, change in muscle strength, numbness or tingling. No change in gait, balance, coordination, mood, affect, memory, mentation, behavior.  Psychiatric: No anxiety, or depression.  Endocrine: No temperature intolerance. No excessive thirst, fluid intake, or urination. No tremor.  Hematologic/Lymphatic: No abnormal bruising or bleeding, blood clots or swollen lymph nodes.  Allergic/Immunologic: No nasal congestion or hives.    PHYSICAL EXAM:    Physical Examination:    /67   Pulse 69   Temp 98.1 °F (36.7 °C) (Oral)   Resp 16   Ht 1.727 m (5' 7.99\")   Wt 110.2 kg (243 lb)   SpO2 97%   BMI 36.96 kg/m²    Constitutional and General Appearance: alert, cooperative, no distress and appears stated age  HEENT: PERRL, no cervical lymphadenopathy. No masses palpable. Normal oral mucosa  Respiratory:  Normal excursion and expansion without use of accessory muscles  Resp Auscultation: Good respiratory effort. No for increased work of breathing. On auscultation: clear  Cardiovascular:  Heart tones are crisp and normal. regular S1 and S2. Murmurs: none  Jugular venous pulsation Normal  Abdomen:  No masses or tenderness  Bowel sounds present  Extremities:   No Cyanosis or Clubbing   Lower extremity edema: none   Skin: Warm and dry  Neurological:  Alert and oriented.  Moves all extremities well  No abnormalities of mood, affect, memory, mentation, or behavior are  mg, 4 mg, IntraVENous, Q6H PRN  melatonin tablet 3 mg, 3 mg, Oral, Nightly PRN  bisacodyl (DULCOLAX) EC tablet 5 mg, 5 mg, Oral, Daily PRN  famotidine (PEPCID) tablet 20 mg, 20 mg, Oral, BID PRN  acetaminophen (TYLENOL) tablet 650 mg, 650 mg, Oral, Q6H PRN **OR** acetaminophen (TYLENOL) suppository 650 mg, 650 mg, Rectal, Q6H PRN  atorvastatin (LIPITOR) tablet 40 mg, 40 mg, Oral, Nightly  [Held by provider] insulin glargine (LANTUS) injection vial 15 Units, 15 Units, SubCUTAneous, BID  ipratropium 0.5 mg-albuterol 2.5 mg (DUONEB) nebulizer solution 1 Dose, 1 Dose, Inhalation, Q6H PRN  glucose chewable tablet 16 g, 4 tablet, Oral, PRN  dextrose bolus 10% 125 mL, 125 mL, IntraVENous, PRN **OR** dextrose bolus 10% 250 mL, 250 mL, IntraVENous, PRN  glucagon injection 1 mg, 1 mg, SubCUTAneous, PRN  dextrose 10 % infusion, , IntraVENous, Continuous PRN  insulin lispro (HUMALOG,ADMELOG) injection vial 0-8 Units, 0-8 Units, SubCUTAneous, 4x Daily AC & HS  midodrine (PROAMATINE) tablet 10 mg, 10 mg, Oral, TID WC  pantoprazole (PROTONIX) tablet 40 mg, 40 mg, Oral, QAM AC  tamsulosin (FLOMAX) capsule 0.4 mg, 0.4 mg, Oral, Daily  ticagrelor (BRILINTA) tablet 90 mg, 90 mg, Oral, BID  enoxaparin (LOVENOX) injection 105 mg, 1 mg/kg, SubCUTAneous, BID    Social History:    Social/Functional History  Lives With: Spouse  Type of Home: House  Home Layout: One level  Home Access: Stairs to enter without rails  Entrance Stairs - Number of Steps: 3  Bathroom Shower/Tub: Tub/Shower unit, Shower chair with back, Curtain  Bathroom Toilet: Handicap height  Bathroom Equipment: Grab bars in shower, Grab bars around toilet, Hand-held shower  Bathroom Accessibility: Not accessible  Home Equipment: Walker - Rolling, Wheelchair - Manual  Has the patient had two or more falls in the past year or any fall with injury in the past year?: Yes (fire dept came in both times to help him get off the floor , no injuries other than bruises, both falls were  reach out to the radiologist and request them to compare the 2 CTs and make an addendum however it may be difficult to compare since the CT scans were done with a different technique  While awaiting comparison of the CT scan I think it is reasonable to anticoagulate patient with an alternative medication such as Lovenox.  Patient is already on it.  I will also check for conditions which can be associate with Eliquis failure such as antiphospholipid antibody syndrome, malignancy, drug interaction or noncompliance  Antibiotics per infectious disease team  Will order workup for anemia  Transfuse to keep hemoglobin above 7  Follow-up on lower extremity ultrasound Dopplers      Addendum:    I personally reviewed patient recent CT scan with the radiologist and compared with the prior scan.  PE reported on the current CT scan is more likely residual clot from the previous bilateral pulmonary embolism.  An addendum will be added to the report by the radiologist.  I do not believe patient has failed Eliquis.  Okay to transition back to Eliquis once intervention planned from my standpoint        Discussed with patient and Nurse.      Thank you for asking us to see this patient.          Jose Mcdonough MD          This note is created with the assistance of a speech recognition program.  While intending to generate a document that actually reflects the content of the visit, the document can still have some errors including those of syntax and sound a like substitutions which may escape proof reading.  It such instances, actual meaning can be extrapolated by contextual diversion.     none